# Patient Record
Sex: MALE | Race: BLACK OR AFRICAN AMERICAN | Employment: FULL TIME | URBAN - METROPOLITAN AREA
[De-identification: names, ages, dates, MRNs, and addresses within clinical notes are randomized per-mention and may not be internally consistent; named-entity substitution may affect disease eponyms.]

---

## 2018-03-15 ENCOUNTER — APPOINTMENT (OUTPATIENT)
Dept: CT IMAGING | Age: 53
End: 2018-03-15

## 2018-03-15 ENCOUNTER — HOSPITAL ENCOUNTER (EMERGENCY)
Age: 53
Discharge: HOME | End: 2018-03-15
Attending: FAMILY MEDICINE

## 2018-03-15 VITALS
SYSTOLIC BLOOD PRESSURE: 132 MMHG | RESPIRATION RATE: 18 BRPM | DIASTOLIC BLOOD PRESSURE: 92 MMHG | BODY MASS INDEX: 29.35 KG/M2 | HEART RATE: 82 BPM | HEIGHT: 70 IN | WEIGHT: 205 LBS | OXYGEN SATURATION: 99 % | TEMPERATURE: 98 F

## 2018-03-15 DIAGNOSIS — J34.89 NASAL CAVITY MASS: Primary | ICD-10-CM

## 2018-03-15 PROCEDURE — 99284 EMERGENCY DEPT VISIT MOD MDM: CPT

## 2018-03-15 PROCEDURE — 70486 CT MAXILLOFACIAL W/O DYE: CPT

## 2018-03-15 RX ORDER — FLUTICASONE PROPIONATE 50 MCG
1 SPRAY, SUSPENSION (ML) NASAL DAILY
Qty: 1 BOTTLE | Refills: 0 | Status: SHIPPED | OUTPATIENT
Start: 2018-03-15

## 2018-03-15 NOTE — ED PROVIDER NOTES
HPI:  3/15/18,   Time: 7:12 AM         Rowena Vazquez is a 47 y.o. male presenting to the ED for out patient CT sinuses. States nasal blockage for past 2 months Seen one month ago for possible sinusitis and treated. Symptoms worsening no history of trauma. The complaint has been persistent, moderate in severity, and worsened by pressure. Saw Plastics and Advised needs surgery. No fever no neck stiffness no headache. Denies any drug or snorting substances. ROS:   Pertinent positives and negatives are stated within HPI, all other systems reviewed and are negative.  --------------------------------------------- PAST HISTORY ---------------------------------------------  Past Medical History:  has a past medical history of Hyperlipidemia and Hypertension. Past Surgical History:  has no past surgical history on file. Social History:  reports that he has been smoking Cigarettes. He has been smoking about 1.00 pack per day. He has never used smokeless tobacco. He reports that he uses drugs, including Marijuana. He reports that he does not drink alcohol. Family History: family history is not on file. The patients home medications have been reviewed. Allergies: Patient has no known allergies. -------------------------------------------------- RESULTS -------------------------------------------------  All laboratory and radiology results have been personally reviewed by myself   LABS:  No results found for this visit on 03/15/18. RADIOLOGY:  Interpreted by Radiologist.  CT Sinus WO Contrast   Final Result   1. 2.6 x 2.6 x 3 cm low-attenuation of the nasal septum   anteroinferiorly with obliteration of the adjacent nasal cavities. Further evaluation with CT with IV contrast or MRI could be helpful. 2. The paranasal sinuses are clear.       ALERT:  THIS IS AN ABNORMAL REPORT              ------------------------- NURSING NOTES AND VITALS REVIEWED ---------------------------   The nursing